# Patient Record
Sex: FEMALE
[De-identification: names, ages, dates, MRNs, and addresses within clinical notes are randomized per-mention and may not be internally consistent; named-entity substitution may affect disease eponyms.]

---

## 2018-11-08 ENCOUNTER — HOSPITAL ENCOUNTER (EMERGENCY)
Dept: HOSPITAL 25 - UCEAST | Age: 23
Discharge: HOME | End: 2018-11-08
Payer: COMMERCIAL

## 2018-11-08 VITALS — SYSTOLIC BLOOD PRESSURE: 113 MMHG | DIASTOLIC BLOOD PRESSURE: 71 MMHG

## 2018-11-08 DIAGNOSIS — J02.9: Primary | ICD-10-CM

## 2018-11-08 PROCEDURE — 87651 STREP A DNA AMP PROBE: CPT

## 2018-11-08 PROCEDURE — 99202 OFFICE O/P NEW SF 15 MIN: CPT

## 2018-11-08 PROCEDURE — G0463 HOSPITAL OUTPT CLINIC VISIT: HCPCS

## 2018-11-08 NOTE — UC
Throat Pain/Nasal Enrique HPI





- HPI Summary


HPI Summary: 





4 days of sore throat, no cough, subj. fever, chills.  denies n/v, rash. no 

sick contacts





- History of Current Complaint


Chief Complaint: UCRespiratory


Stated Complaint: SORE THROAT


Time Seen by Provider: 11/08/18 15:38


Hx Obtained From: Patient


Hx Last Menstrual Period: 3 WEEKS AGO


Pregnant?: No - bcp


Severity: Mild


Pain Intensity: 3


Pain Scale Used: 0-10 Numeric


Associated Signs & Symptoms: Positive: Negative





- Allergies/Home Medications


Allergies/Adverse Reactions: 


 Allergies











Allergy/AdvReac Type Severity Reaction Status Date / Time


 


No Known Allergies Allergy   Verified 11/08/18 15:35











Home Medications: 


 Home Medications





Birth Control* 1 tab PO DAILY 11/08/18 [History Confirmed 11/08/18]


Ibuprofen TAB* [Advil TAB*] 400 mg PO PRN 11/08/18 [History]











PMH/Surg Hx/FS Hx/Imm Hx


Previously Healthy: Yes





- Surgical History


Surgical History: None





- Social History


Alcohol Use: Occasionally


Substance Use Type: Marijuana


Smoking Status (MU): Never Smoked Tobacco





Review of Systems


All Other Systems Reviewed And Are Negative: Yes


Constitutional: Positive: Fever, Chills


Skin: Positive: Negative


Eyes: Negative: Drainage


Respiratory: Positive: Negative


Cardiovascular: Positive: Negative


Gastrointestinal: Positive: Negative


Is Patient Immunocompromised?: No





Physical Exam


Triage Information Reviewed: Yes


Appearance: Well-Appearing


Vital Signs: 


 Initial Vital Signs











Temp  97.6 F   11/08/18 15:31


 


Pulse  104   11/08/18 15:31


 


Resp  16   11/08/18 15:31


 


BP  113/71   11/08/18 15:31


 


Pulse Ox  100   11/08/18 15:31











Vital Signs Reviewed: Yes


ENT: Positive: Pharyngeal erythema, Tonsillar swelling, Tonsillar exudate, 

Uvula midline.  Negative: Sinus tenderness


Neck: Positive: Tenderness @ - L ant. cervical


Respiratory Exam: Normal


Cardiovascular Exam: Normal


Skin Exam: Normal





Throat Pain/Nasal Course/Dx





- Course


Assessment/Plan: Pharyngitis w/ exudates but neg rapid strep





- Differential Dx/Diagnosis


Differential Diagnosis/HQI/PQRI: Influenza, Pharyngitis, Tonsillitis


Provider Diagnoses: Pharyngitis





Discharge





- Sign-Out/Discharge


Documenting (check all that apply): Patient Departure


All imaging exams completed and their final reports reviewed: No Studies





- Discharge Plan


Condition: Good


Disposition: HOME


Prescriptions: 


Penicillin VK TAB* [Penicillin  mg Tab*] 500 mg PO BID #20 tab


Patient Education Materials:  Pharyngitis (ED)


Referrals: 


No Primary Care Phys,NOPCP [Primary Care Provider] - 


Additional Instructions: 


please return if not improving





- Billing Disposition and Condition


Condition: GOOD


Disposition: Home